# Patient Record
Sex: FEMALE | Race: WHITE | NOT HISPANIC OR LATINO | Employment: FULL TIME | URBAN - METROPOLITAN AREA
[De-identification: names, ages, dates, MRNs, and addresses within clinical notes are randomized per-mention and may not be internally consistent; named-entity substitution may affect disease eponyms.]

---

## 2020-07-16 ENCOUNTER — OFFICE VISIT CONVERTED (OUTPATIENT)
Dept: SURGERY | Facility: CLINIC | Age: 50
End: 2020-07-16
Attending: SURGERY

## 2020-08-05 ENCOUNTER — HOSPITAL ENCOUNTER (OUTPATIENT)
Dept: GENERAL RADIOLOGY | Facility: HOSPITAL | Age: 50
Discharge: HOME | End: 2020-08-05
Attending: NURSE PRACTITIONER

## 2020-08-20 ENCOUNTER — CONVERSION ENCOUNTER (OUTPATIENT)
Dept: CARDIOLOGY | Facility: CLINIC | Age: 50
End: 2020-08-20
Attending: INTERNAL MEDICINE

## 2020-08-20 ENCOUNTER — CONVERSION ENCOUNTER (OUTPATIENT)
Dept: OTHER | Facility: HOSPITAL | Age: 50
End: 2020-08-20

## 2021-01-08 ENCOUNTER — HOSPITAL ENCOUNTER (OUTPATIENT)
Dept: GASTROENTEROLOGY | Facility: HOSPITAL | Age: 51
Setting detail: HOSPITAL OUTPATIENT SURGERY
Discharge: HOME | End: 2021-01-08
Attending: SURGERY

## 2021-01-08 LAB — HCG UR QL: NEGATIVE

## 2021-05-10 NOTE — H&P
History and Physical      Patient Name: Librado Le   Patient ID: [id number]   Sex: Female   YOB: 1970    Primary Care Provider: Easton WILKINSON   Referring Provider: Easton WILKINSON    Visit Date: July 16, 2020    Provider: Roldan Metzger MD   [facility]   Location Address: [address] [place] [address]  [id number]   Location Phone: [phone]          Chief Complaint  · Outpatient History & Physical / Surgical Orders  · Colon Consult  · FAMILY HISTORY OF COLON CANCER  · LAST 10 YEARS AGO      History Of Present Illness  Librado Le is a 50 year old /White female who presents to the office today as a consult from Easton WILKINSON. She presents today for evaluation for a colonoscopy. She reports she had a colonoscopy about 15-20 years ago. Her father was diagnosed with colon cancer in his early 50's and they started doing colonoscopies early. She had a colonoscopy about 15 years ago or more and it was normal. She hasn't had a colonoscopy since. Currently, she is not reporting any symptoms. No nausea or vomiting, No fevers or chills. She is eating and drinking normally and having regular bowel movements. No narrowed or pencil thin stools. No blood per rectum. As stated, she does have the known family history of colon cancer.       Past Medical History  Allergic rhinitis, chronic; Allergies; Bladder Disorder         Past Surgical History  LASIK         Medication List  Multi Vitamin 9 mg iron/15 mL oral liquid         Allergy List  NO KNOWN DRUG ALLERGIES; NO KNOWN DRUG ALLERGIES       Allergies Reconciled  Family Medical History  Breast Neoplasm; Colon Cancer; Family history of colon cancer; Family history of breast cancer         Social History  Alcohol use; Tobacco (Never)         Review of Systems  · Gastrointestinal  o Denies  o : nausea, vomiting, diarrhea, constipation      Vitals  Date Time BP Position Site L\R Cuff Size HR RR TEMP (F) WT  HT   "BMI kg/m2 BSA m2 O2 Sat        07/16/2020 09:17 AM         180lbs 0oz 5'  6\" 29.05 1.95           Physical Examination  · Constitutional  o Appearance  o : well developed, well-nourished, alert and in no acute distress  · Head and Face  o Head  o :   § Inspection  § : no deformities or lesions  · Eyes  o Conjunctivae  o : clear  o Sclerae  o : clear  · Neck  o Inspection/Palpation  o : normal appearance, no masses or tenderness, trachea midline  · Respiratory  o Respiratory Effort  o : breathing unlabored  o Inspection of Chest  o : normal appearance, no retractions  · Cardiovascular  o Heart  o : regular rate and rhythm  · Gastrointestinal  o Abdominal Examination  o : abdomen is soft  · Lymphatic  o Neck  o : no lymphadenopathy present  o Axilla  o : no lymphadenopathy present  o Groin  o : no lymphadenopathy present  · Skin and Subcutaneous Tissue  o General Inspection  o : no rashes present, no lesions present, no areas of discoloration  · Neurologic  o Cranial Nerves  o : grossly intact  o Sensation  o : grossly intact  o Gait and Station  o :   § Gait Screening  § : normal gait, able to stand without diffculty  o Cerebellar Function  o : no obvious abnormalities  · Psychiatric  o Judgement and Insight  o : judgment and insight intact  o Mood and Affect  o : mood normal, affect appropriate          Assessment  · Pre-Surgical Orders     V72.84  · Screening for colon cancer     V76.51/Z12.11  · Pre-op testing     V72.84/Z01.818  · Family history of colon cancer     V16.0/Z80.0       Patient in need of a screening colonoscopy for a family history of colon cancer.     Problems Reconciled  Plan  · Orders  o Colonoscopy (85780) - V76.51/Z12.11, V16.0/Z80.0 - 09/11/2020  o [facility] Pre-Op Covid-19 Screening (85505) - V72.84/Z01.818 - 09/08/2020   [address] -- 09/08 @  **SELF ISOLATE AFTER TEST IS DONE UNTIL DAY OF PROCEDURE**  · Medications  o Suprep Bowel Prep Kit 17.5-3.13-1.6 gram oral recon soln "   SIG: take as directed for 1 day   DISP: (1) 177 ml bottle with 0 refills  Prescribed on 07/16/2020     o Medications have been Reconciled  o Transition of Care or Provider Policy  · Instructions  o PLAN:   o Handouts Provided-Pre-Procedure Instructions including date and time and location of procedure.  o Surgical Facility: [facility]  o ****Patient Status****  o Outpatient  o ********************  o RISK AND BENEFITS:  o Consent for surgery: Given these options, the patient has verbally expressed an understanding of the risks of surgery and finds these risks acceptable. We will proceed with surgery as soon as possible.  o Consult Anesthesia for any post-operative block, or any pain management procedure deemed necessary by the anestesiologist for adequate post-operative pain control.   o O.R. PREP: Per protocol  o IV: Per Anesthesia  o PLEASE SIGN PERMIT FOR: COLONOSCOPY WITH POSSILE BIOPSIES  o *___The above History and Physical Examination has been completed within 30 days of admission.  o Electronically Identified Patient Education Materials Provided Electronically  · Referrals  o ID: [id number] Date: 07/14/2020 Type: Inbound  Specialty: General Surgery     We will plan for a screening colonoscopy in the near future. Risks, benefits, and alternatives were discussed with the patient extensively. All questions were answered. The patient voiced understanding and agrees to proceed with the above plan.             Electronically Signed by: Funmilayo Hawthorne-, -Author on July 16, 2020 01:37:13 PM  Electronically Co-signed by: Roldan Metzger MD -Reviewer on July 16, 2020 10:36:19 PM

## 2021-05-10 NOTE — H&P
History and Physical      Patient Name: Librado Le   Patient ID: [id number]   Sex: Female   YOB: 1970    Primary Care Provider: Easton WILKINSON   Referring Provider: Easton WILKINSON    Visit Date: August 20, 2020    Provider: Roldan Perrin MD   Location: [facility]   Location Address: [address] [place] [address], [facility]   ARLENE [place]  [id number]   Location Phone: [phone]          Chief Complaint  · Palpitations      History Of Present Illness  Consult requested by: Easton WILKINSON   Librado Le is a 50-year-old female with symptoms of heart palpitations that she states started in December when she was in [place]. This episode occurred after she had had alcoholic beverages that night. She woke up about 2 AM with tachycardia. Unsure of how long it persisted. It eventually did get better. No related associated symptoms otherwise and she went back to sleep. She woke up suddenly again but does not remember why. She states she may have had one other episode of tachycardia in January. She denies chest pain or shortness of breath, and she has had no syncope.   PAST MEDICAL HISTORY: Significant for sleep apnea.   FAMILY MEDICAL HISTORY: Significant for her mom who had MI in her 40s but no stenting or bypass surgery.   PSYCHOSOCIAL HISTORY: The patient is . No history of mood change or depression. She does not smoke. She uses a moderate amount of alcohol and has caffeine daily.   CURRENT MEDICATIONS: Multivitamin daily.   ALLERGIES: No known drug allergies.       Review of Systems  · Constitutional  o Admits  o : good general health lately  o Denies  o : fatigue, recent weight changes   · Eyes  o Denies  o : double vision  · HENT  o Admits  o : hearing loss or ringing  o Denies  o : chronic sinus problem, swollen glands in neck  · Cardiovascular  o Admits  o : palpitations (fast, fluttering, or skipping beats), swelling (feet, ankles, hands)  o Denies  o : chest  "pain, shortness of breath while walking or lying flat  · Respiratory  o Denies  o : asthma or wheezing, COPD  · Gastrointestinal  o Denies  o : ulcers, nausea or vomiting  · Neurologic  o Denies  o : lightheaded or dizzy, stroke, headaches  · Musculoskeletal  o Denies  o : joint pain, back pain  · Endocrine  o Denies  o : thyroid disease, diabetes, heat or cold intolerance, excessive thirst or urination  · Heme-Lymph  o Denies  o : bleeding or bruising tendency, anemia      Vitals  Date Time BP Position Site L\R Cuff Size HR RR TEMP (F) WT  HT  BMI kg/m2 BSA m2 O2 Sat HC       08/20/2020 10:14 /74 Sitting    62 - R   182lbs 0oz 5'  6\" 29.38 1.96     08/20/2020 10:14 /74 Sitting    60 - R                 Physical Examination  · Constitutional  o Appearance  o : Awake, alert, in no acute distress.   · Head and Face  o HEENT  o : PERRLA.  · Eyes  o Conjunctivae  o : Normal.  · Ears, Nose, Mouth and Throat  o Oral Cavity  o :   § Oral Mucosa  § : Normal.  · Neck  o Inspection/Palpation  o : No JVD.  · Respiratory  o Respiratory  o : Chest is symmetrical. Lung fields are clear. No rhonchi or wheezes heard.  · Cardiovascular  o Heart  o :   § Auscultation of Heart  § : S1, S2 are normal. Regular rate and rhythm without murmurs, gallops, or rubs.  o Peripheral Vascular System  o :   § Extremities  § : Nails normal. No clubbing or cyanosis. Femoral pulses adequate. Pedal pulses adequate. No peripheral edema.  · Gastrointestinal  o Abdominal Examination  o : Abdomen soft. No masses. No guarding or rigidity. No hepatosplenomegaly. Bowel sounds normal.  · Musculoskeletal  o General  o :   § General Musculoskeletal  § : Muscle tone and strength were normal.  · Skin and Subcutaneous Tissue  o General Inspection  o : Unremarkable.  · EKG  o Results  o : Normal sinus rhythm.     Echocardiogram today showed normal ejection fraction of 55% with trace mitral and tricuspid regurgitation.           Assessment     ASSESSMENT " AND PLAN:  1.  Tachycardia.  Patient's issues may have been some kind of paroxysmal SVT possibly induced by her alcohol        use.  She does have a structurally normal echocardiogram and EKG making any related dysrhythmias very        unlikely, and she has not really had any recurrent episodes in the last six months.  Recommended        minimizing alcohol use to no more than two beverages per day.  If patient has more symptomatic recurrent        episodes, she is to call back and we will get a 24-hour Holter monitor.   2.  Borderline hypertension.  Recommended keeping a home blood pressure log, low sodium diet, exercising        aerobically, and weight loss.    MD TIEN Celaya/naa    This note was transcribed by Nelida Armendariz.  naa/tien  The above service was transcribed by Nelida Armendariz, and I attest to the accuracy of the note.  TIEN             Electronically Signed by: Joleen Armendariz-, Other -Author on August 21, 2020 10:05:59 AM  Electronically Co-signed by: Roldan Perrin MD -Reviewer on August 24, 2020 08:41:55 AM

## 2021-05-10 NOTE — PROCEDURES
"   Procedure Note      Patient Name: Librado Le   Patient ID: [id number]   Sex: Female   YOB: 1970    Primary Care Provider: Easton WILKINSON   Referring Provider: Easton WILKINSON    Visit Date: August 20, 2020    Provider: Roldan Perrin MD   Location: [facility]   Location Address: [address] [place] [address], [address]   AR[place]E [place]  [id number]   Location Phone: [phone]          FINAL REPORT   TRANSTHORACIC ECHOCARDIOGRAM REPORT    Diagnosis: Palpitations   Height: 5'6\" Weight: 182 B/P: 152/74 BSA: 2.0   Tech: JTW   MEASUREMENTS:  RVID (Diastole) : RVID. (NORMAL: 0.7 to 2.4 cm max)   LVID (Systole): 3.2 cm (Diastole): 4.5 cm. (NORMAL: 3.7 - 5.4 cm)   Posterior Wall Thickness (Diastole): 1.0 cm. (NORMAL: 0.8 - 1.1 cm)   Septal Thickness (Diastole): 1.0 cm. (NORMAL: 0.7 - 1.2 cm)   LAID (Systole): 3.3 cm. (NORMAL: 1.9 - 3.8 cm)   Aortic Root Diameter (Diastole): 2.8 cm. (NORMAL: 2.0 - 3.7 cm)   MITRL.VLV.CLAIRE.D.D.R: 80 mm/sec-150 mm/sec   DOPPLER: Continuous-wave, pulse-wave, and color-flow examination of the mitral, aortic, and tricuspid valves was performed. No significant stenosis was identified. Trace mitral and tricuspid regurgitation. Doppler flow velocities were normal. E/A ratio is 1.4. DT= 190 msec. IVRT is 109 msec. E/e' is 11.   COMMENTS:  The patient underwent 2-D, M-Mode, and Doppler examination, including pulse-wave, continuous-wave, and color-flow Doppler analysis; the study is technically adequate. The following was observed:   FINDINGS:  AORTIC VALVE: Appeared to be normal. Trileaflet with central closure point. No evidence of any obstruction. No regurgitation.   MITRAL VALVE: Appeared to be normal. No evidence of any obstruction. No regurgitation.   TRICUSPID VALVE: Appeared to be normal.   PULMONIC VALVE: Not well seen.   LEFT ATRIUM: Appeared to be normal. No intracavity masses or clots seen. LA volume index is 18 mL/m2.   AORTIC ROOT: Appeared to be " normal in size.   LEFT VENTRICLE: Appeared to have an overall normal left ventricular systolic function with a normal EF of 55%. There is no evidence of any wall motion abnormalities. No cavitary dilatation. Normal wall thickness.   RIGHT ATRIUM: Appeared to be normal; no obvious evidence of intracavity masses or clots.   RIGHT VENTRICLE: Normal size and function.   PERICARDIUM: Unremarkable. No evidence of effusion.   INFERIOR VENA CAVA: Diameter is 1.8 cm.   Fax: 08/21/2020      CONCLUSION:  1.  Normal ejection fraction of 55%.   2.  Trace mitral regurgitation.   3.  Trace tricuspid regurgitation.      MD TIEN Celaya/naa    This note was transcribed by Nelida Armendariz.  naa/tien  The above service was transcribed by Nelida Armendariz, and I attest to the accuracy of the note.                   Electronically Signed by: Joleen Armendariz-, Other -Author on August 21, 2020 10:08:25 AM  Electronically Co-signed by: Roldan Perrin MD -Reviewer on August 24, 2020 08:48:09 AM

## 2021-05-14 VITALS
HEART RATE: 62 BPM | WEIGHT: 182 LBS | DIASTOLIC BLOOD PRESSURE: 74 MMHG | BODY MASS INDEX: 29.25 KG/M2 | HEIGHT: 66 IN | SYSTOLIC BLOOD PRESSURE: 152 MMHG

## 2021-05-15 VITALS — BODY MASS INDEX: 28.93 KG/M2 | HEIGHT: 66 IN | WEIGHT: 180 LBS

## 2021-09-22 ENCOUNTER — TRANSCRIBE ORDERS (OUTPATIENT)
Dept: ADMINISTRATIVE | Facility: HOSPITAL | Age: 51
End: 2021-09-22

## 2021-09-22 DIAGNOSIS — Z12.31 SCREENING MAMMOGRAM FOR HIGH-RISK PATIENT: Primary | ICD-10-CM

## 2021-12-13 ENCOUNTER — HOSPITAL ENCOUNTER (OUTPATIENT)
Dept: MAMMOGRAPHY | Facility: HOSPITAL | Age: 51
Discharge: HOME | End: 2021-12-13
Admitting: NURSE PRACTITIONER

## 2021-12-13 DIAGNOSIS — Z12.31 SCREENING MAMMOGRAM FOR HIGH-RISK PATIENT: ICD-10-CM

## 2021-12-13 PROCEDURE — 77067 SCR MAMMO BI INCL CAD: CPT

## 2021-12-13 PROCEDURE — 77063 BREAST TOMOSYNTHESIS BI: CPT

## 2022-09-22 ENCOUNTER — TRANSCRIBE ORDERS (OUTPATIENT)
Dept: ADMINISTRATIVE | Facility: HOSPITAL | Age: 52
End: 2022-09-22

## 2022-09-22 DIAGNOSIS — Z12.31 SCREENING MAMMOGRAM, ENCOUNTER FOR: Primary | ICD-10-CM

## 2022-10-31 ENCOUNTER — TELEPHONE (OUTPATIENT)
Dept: OBSTETRICS AND GYNECOLOGY | Facility: CLINIC | Age: 52
End: 2022-10-31

## 2022-12-16 ENCOUNTER — HOSPITAL ENCOUNTER (OUTPATIENT)
Dept: MAMMOGRAPHY | Facility: HOSPITAL | Age: 52
Discharge: HOME | End: 2022-12-16
Admitting: NURSE PRACTITIONER

## 2022-12-16 DIAGNOSIS — Z12.31 SCREENING MAMMOGRAM, ENCOUNTER FOR: ICD-10-CM

## 2022-12-16 PROCEDURE — 77067 SCR MAMMO BI INCL CAD: CPT

## 2022-12-16 PROCEDURE — 77063 BREAST TOMOSYNTHESIS BI: CPT

## 2022-12-29 ENCOUNTER — OFFICE VISIT (OUTPATIENT)
Dept: OBSTETRICS AND GYNECOLOGY | Facility: CLINIC | Age: 52
End: 2022-12-29

## 2022-12-29 VITALS — SYSTOLIC BLOOD PRESSURE: 145 MMHG | WEIGHT: 194.2 LBS | BODY MASS INDEX: 31.34 KG/M2 | DIASTOLIC BLOOD PRESSURE: 86 MMHG

## 2022-12-29 DIAGNOSIS — Z76.89 ENCOUNTER TO ESTABLISH CARE: Primary | ICD-10-CM

## 2022-12-29 DIAGNOSIS — R87.610 ATYPICAL SQUAMOUS CELL CHANGES OF UNDETERMINED SIGNIFICANCE (ASCUS) ON CERVICAL CYTOLOGY WITH NEGATIVE HIGH RISK HUMAN PAPILLOMA VIRUS (HPV) TEST RESULT: ICD-10-CM

## 2022-12-29 PROBLEM — Z80.3 FAMILY HX-BREAST MALIGNANCY: Status: ACTIVE | Noted: 2022-12-29

## 2022-12-29 PROCEDURE — 99202 OFFICE O/P NEW SF 15 MIN: CPT | Performed by: OBSTETRICS & GYNECOLOGY

## 2022-12-29 RX ORDER — MULTIPLE VITAMINS W/ MINERALS TAB 9MG-400MCG
1 TAB ORAL DAILY
COMMUNITY

## 2022-12-29 RX ORDER — RIBOFLAVIN (VITAMIN B2) 100 MG
1 TABLET ORAL DAILY
COMMUNITY

## 2022-12-29 NOTE — PROGRESS NOTES
GYN Visit    HPI:   52 y.o. LMP: Patient's last menstrual period was 2022 (approximate).  She is here to establish care. She was original sent for abnormal pap smear but it was ASCUS and HPV negative, she would qualify for repeat pap smear in 3 years. She is not menopausal yet but her periods are regular, less than 7 days and not heavy. She denies any vasomotor symptoms or vaginal dryness. She is sexually active with one male partner and denies any h/o stds or pain with intercourse. She does have family H/O breast cancer in her maternal grandmother and maternal aunt but both diagnosed older than 50. No family hx of uterine or ovarian cancer. She denies any incontinence issues. She denies any vaginal odor, discharge or irritation, dysuria or hematuria, F/C, D/C, N/V, CP or SOB.     Social History     Substance and Sexual Activity   Sexual Activity Not Currently   • Partners: Male       History: PMHx, Meds, Allergies, PSHx, Social Hx, and POBHx all reviewed and updated.    PHYSICAL EXAM:  /86 (BP Location: Left arm, Patient Position: Sitting, Cuff Size: Adult)   Wt 88.1 kg (194 lb 3.2 oz)   LMP 2022 (Approximate)   Breastfeeding No   BMI 31.34 kg/m²   General- NAD, alert and oriented, appropriate  Psych- Normal mood, good memory  Neck- No masses, no thyroid enlargement  Cardiovascular- Regular rhythm, no murnurs  Respiratory- CTA to bases, no wheezes  Abdomen- Soft, non distended, non tender, no masses        ASSESSMENT AND PLAN:  Diagnoses and all orders for this visit:    1. Encounter to establish care (Primary)  -     Cancel: IgP, Aptima HPV    2. Atypical squamous cell changes of undetermined significance (ASCUS) on cervical cytology with negative high risk human papilloma virus (HPV) test result    Other orders  -     SCANNED - PAP SMEAR      Follow Up:  Return in about 6 months (around 2023) for Annual exam.    I spent 20 minutes caring for Librado on this date of service. This  time includes time spent by me in the following activities:preparing for the visit, reviewing tests, obtaining and/or reviewing a separately obtained history, performing a medically appropriate examination and/or evaluation  and counseling and educating the patient/family/caregiver    Devi Loredo DO  12/29/2022    [facility]  [address] [place] [address]  Dept: [phone]  Dept Fax: [phone]  Loc: [phone]  Loc Fax: [phone]

## 2023-07-26 ENCOUNTER — TRANSCRIBE ORDERS (OUTPATIENT)
Dept: ADMINISTRATIVE | Facility: HOSPITAL | Age: 53
End: 2023-07-26
Payer: OTHER GOVERNMENT

## 2023-07-26 DIAGNOSIS — Z01.419 WELL WOMAN EXAM WITH ROUTINE GYNECOLOGICAL EXAM: Primary | ICD-10-CM

## 2024-01-09 ENCOUNTER — OFFICE VISIT (OUTPATIENT)
Dept: PULMONOLOGY | Facility: CLINIC | Age: 54
End: 2024-01-09
Payer: COMMERCIAL

## 2024-01-09 ENCOUNTER — HOSPITAL ENCOUNTER (OUTPATIENT)
Dept: GENERAL RADIOLOGY | Facility: HOSPITAL | Age: 54
Discharge: HOME | End: 2024-01-09
Admitting: INTERNAL MEDICINE
Payer: COMMERCIAL

## 2024-01-09 VITALS
BODY MASS INDEX: 31.98 KG/M2 | RESPIRATION RATE: 18 BRPM | DIASTOLIC BLOOD PRESSURE: 65 MMHG | TEMPERATURE: 100 F | WEIGHT: 199 LBS | HEIGHT: 66 IN | SYSTOLIC BLOOD PRESSURE: 128 MMHG | OXYGEN SATURATION: 99 % | HEART RATE: 69 BPM

## 2024-01-09 DIAGNOSIS — R09.82 POSTNASAL DRIP: ICD-10-CM

## 2024-01-09 DIAGNOSIS — R06.02 SHORTNESS OF BREATH: ICD-10-CM

## 2024-01-09 DIAGNOSIS — G47.33 OBSTRUCTIVE SLEEP APNEA: ICD-10-CM

## 2024-01-09 DIAGNOSIS — G47.33 OBSTRUCTIVE SLEEP APNEA: Primary | ICD-10-CM

## 2024-01-09 PROCEDURE — 71046 X-RAY EXAM CHEST 2 VIEWS: CPT

## 2024-01-09 PROCEDURE — 99203 OFFICE O/P NEW LOW 30 MIN: CPT | Performed by: INTERNAL MEDICINE

## 2024-01-09 RX ORDER — FLUTICASONE PROPIONATE 50 MCG
SPRAY, SUSPENSION (ML) NASAL
COMMUNITY
Start: 2023-08-31

## 2024-01-09 RX ORDER — FLUTICASONE PROPIONATE 50 MCG
2 SPRAY, SUSPENSION (ML) NASAL DAILY
Qty: 1 G | Refills: 5 | Status: SHIPPED | OUTPATIENT
Start: 2024-01-09

## 2024-01-09 RX ORDER — ERGOCALCIFEROL 1.25 MG/1
50000 CAPSULE ORAL
COMMUNITY
Start: 2023-11-13

## 2024-01-09 RX ORDER — CHLORAL HYDRATE 500 MG
CAPSULE ORAL
COMMUNITY

## 2024-01-09 RX ORDER — FEXOFENADINE HCL 180 MG/1
180 TABLET ORAL
COMMUNITY
Start: 2023-08-31

## 2024-01-09 RX ORDER — DESONIDE 0.5 MG/G
OINTMENT TOPICAL
COMMUNITY
Start: 2023-11-30

## 2024-01-09 NOTE — PROGRESS NOTES
Pulmonary Consultation    Aida Patton, APRN,    Thank you for asking me to see Librado Le for   Chief Complaint   Patient presents with    Establish Care    Shortness of Breath   .      History of Present Illness  Librado Le is a 53 y.o. female with a PMH significant for obstructive sleep apnea and nasal allergies presents for shortness of breath and wheezing off-and-on for the.  Of about more than a year she is using her CPAP and is compliant she has gained some weight she denies any significant cough or expectoration and there is no history of asthma patient does not smoke she denies any chest pain fever or weight loss or leg pain or swelling      Tobacco use history:  Never smoker      Review of Systems: History obtained from chart review and the patient.  Review of Systems   Respiratory:  Positive for shortness of breath and wheezing.    All other systems reviewed and are negative.    As described in the HPI. Otherwise, remainder of ROS (14 systems) were negative.    Patient Active Problem List   Diagnosis    Family hx-breast malignancy         Current Outpatient Medications:     desonide (DESOWEN) 0.05 % ointment, , Disp: , Rfl:     fexofenadine (ALLEGRA) 180 MG tablet, 1 tablet., Disp: , Rfl:     fluticasone (FLONASE) 50 MCG/ACT nasal spray, USE 2 SPRAYS IN EACH NOSTRIL DAILY CONGESTION - SHAKE WELL BEFORE USING. PLEASE OVERNIGHT, Disp: , Rfl:     multivitamin with minerals tablet tablet, Take 1 tablet by mouth Daily., Disp: , Rfl:     Omega-3 Fatty Acids (fish oil) 1000 MG capsule capsule, Take  by mouth., Disp: , Rfl:     psyllium (METAMUCIL) 58.6 % packet, Take 1 packet by mouth Daily., Disp: , Rfl:     vitamin D (ERGOCALCIFEROL) 1.25 MG (12425 UT) capsule capsule, Take 1 capsule by mouth Every 7 (Seven) Days., Disp: , Rfl:     fluticasone (FLONASE) 50 MCG/ACT nasal spray, 2 sprays into the nostril(s) as directed by provider Daily., Disp: 1 g, Rfl: 5    Allergies   Allergen Reactions  "   Eggs Or Egg-Derived Products Other (See Comments)       Past Medical History:   Diagnosis Date    Abnormal Pap smear of cervix     Chlamydia      Past Surgical History:   Procedure Laterality Date    LASIK       Social History     Socioeconomic History    Marital status:    Tobacco Use    Smoking status: Never    Smokeless tobacco: Never   Vaping Use    Vaping Use: Never used   Substance and Sexual Activity    Alcohol use: Yes     Comment: occasionally    Drug use: Never    Sexual activity: Not Currently     Partners: Male     Family History   Problem Relation Age of Onset    Colon cancer Father         50s    Breast cancer Maternal Grandmother         60s    Breast cancer Maternal Aunt         Pt does not know age of onset    Ovarian cancer Neg Hx     Uterine cancer Neg Hx     Melanoma Neg Hx     Deep vein thrombosis Neg Hx     Pulmonary embolism Neg Hx        No radiology results for the last 90 days.        Objective     Blood pressure 128/65, pulse 69, temperature 100 °F (37.8 °C), temperature source Temporal, resp. rate 18, height 167.6 cm (66\"), weight 90.3 kg (199 lb), SpO2 99%, not currently breastfeeding.  Physical Exam  Vitals and nursing note reviewed.   Constitutional:       Appearance: Normal appearance.   HENT:      Head: Normocephalic and atraumatic.      Nose: Congestion present.      Mouth/Throat:      Mouth: Mucous membranes are moist.      Pharynx: Oropharynx is clear.   Eyes:      Extraocular Movements: Extraocular movements intact.      Conjunctiva/sclera: Conjunctivae normal.      Pupils: Pupils are equal, round, and reactive to light.   Cardiovascular:      Rate and Rhythm: Normal rate and regular rhythm.      Pulses: Normal pulses.      Heart sounds: Normal heart sounds.   Pulmonary:      Effort: Pulmonary effort is normal.      Breath sounds: Normal breath sounds.   Abdominal:      General: Abdomen is flat. Bowel sounds are normal.      Palpations: Abdomen is soft. "   Musculoskeletal:         General: Normal range of motion.      Cervical back: Normal range of motion and neck supple.   Skin:     General: Skin is warm.      Capillary Refill: Capillary refill takes 2 to 3 seconds.   Neurological:      General: No focal deficit present.      Mental Status: She is alert and oriented to person, place, and time.   Psychiatric:         Mood and Affect: Mood normal.         Behavior: Behavior normal.       Immunization History   Administered Date(s) Administered    Adenovirus, Unspecified Formulation 02/17/1992    Anthrax 05/06/1999, 07/22/1999, 08/09/1999, 01/10/2000, 07/21/2000, 09/28/2004, 06/08/2012    FluMist 2-49yrs 10/23/2013, 09/23/2014, 09/28/2015    Fluzone (or Fluarix & Flulaval for VFC) >6mos 02/22/2022    H1N1 Nasal 12/16/2009, 12/17/2009    Hepatitis A 05/05/2003, 09/28/2004    Hepatitis B Adult/Adolescent IM 05/04/1992, 01/24/2003, 02/25/2003, 09/01/2009    IPV 02/17/1992    Influenza LAIV (Nasal) 11/01/2005, 11/01/2007, 11/02/2007, 10/16/2008, 09/28/2009, 09/24/2010, 09/27/2011, 10/23/2013    Influenza Quad Vaccine (Inpatient) 09/24/2012, 09/27/2016    Influenza Seasonal Injectable 12/18/2017    Influenza Whole 09/24/2010    MMR 02/17/1992    Meningococcal Polysaccharide 02/17/1992    PPD Test 09/01/2009    Rabies IM 2 05/04/1992, 05/11/1992, 06/01/1992    Smallpox 06/08/2012    TD Preservative Free (Tenivac) 01/03/2023    Td (TDVAX) 01/24/2003    Tdap 06/01/2012, 06/08/2012    Typhoid Inactivated 10/26/1993, 09/28/2004, 06/08/2012    Yellow Fever 10/26/1993    influenza Split 11/25/2002, 10/27/2003, 01/26/2005, 11/08/2006, 11/02/2007            Assessment & Plan     Diagnoses and all orders for this visit:    1. Obstructive sleep apnea (Primary)  -     Complete PFT - Pre & Post Bronchodilator; Future  -     XR Chest 2 View; Future    2. Shortness of breath  -     Complete PFT - Pre & Post Bronchodilator; Future  -     XR Chest 2 View; Future    3. Postnasal drip  -      Complete PFT - Pre & Post Bronchodilator; Future  -     XR Chest 2 View; Future    Other orders  -     fluticasone (FLONASE) 50 MCG/ACT nasal spray; 2 sprays into the nostril(s) as directed by provider Daily.  Dispense: 1 g; Refill: 5         Result Review :       Data reviewed : Radiologic studies        Discussion/ Recommendations:   Will order PFTs and chest x-ray for further evaluation  Advised to continue with the CPAP and compliance  Advised to reduce weight her BMI is 32.12  Regular exercise  Will start her on Flonase nasal spray for postnasal drip  Discussed vaccination and recommended    BMI is >= 30 and <35. (Class 1 Obesity). The following options were offered after discussion;: exercise counseling/recommendations           Return in about 1 month (around 2/9/2024).      Thank you for allowing me to participate in the care of Librado Le. Please do not hesitate to contact me with any questions.         This document has been electronically signed by Marcelo Michael MD on January 9, 2024 08:41 EST

## 2024-01-30 ENCOUNTER — HOSPITAL ENCOUNTER (OUTPATIENT)
Dept: RESPIRATORY THERAPY | Facility: HOSPITAL | Age: 54
Discharge: HOME | End: 2024-01-30
Admitting: INTERNAL MEDICINE
Payer: OTHER GOVERNMENT

## 2024-01-30 DIAGNOSIS — G47.33 OBSTRUCTIVE SLEEP APNEA: ICD-10-CM

## 2024-01-30 DIAGNOSIS — R09.82 POSTNASAL DRIP: ICD-10-CM

## 2024-01-30 DIAGNOSIS — R06.02 SHORTNESS OF BREATH: ICD-10-CM

## 2024-01-30 PROCEDURE — 94729 DIFFUSING CAPACITY: CPT

## 2024-01-30 PROCEDURE — 94060 EVALUATION OF WHEEZING: CPT

## 2024-01-30 PROCEDURE — 94726 PLETHYSMOGRAPHY LUNG VOLUMES: CPT

## 2024-01-30 RX ORDER — ALBUTEROL SULFATE 2.5 MG/3ML
2.5 SOLUTION RESPIRATORY (INHALATION) ONCE
Status: COMPLETED | OUTPATIENT
Start: 2024-01-30 | End: 2024-01-30

## 2024-01-30 RX ADMIN — ALBUTEROL SULFATE 2.5 MG: 2.5 SOLUTION RESPIRATORY (INHALATION) at 12:55

## 2024-02-08 ENCOUNTER — OFFICE VISIT (OUTPATIENT)
Dept: PULMONOLOGY | Facility: CLINIC | Age: 54
End: 2024-02-08
Payer: OTHER GOVERNMENT

## 2024-02-08 VITALS
BODY MASS INDEX: 32.17 KG/M2 | OXYGEN SATURATION: 99 % | DIASTOLIC BLOOD PRESSURE: 55 MMHG | RESPIRATION RATE: 16 BRPM | WEIGHT: 200.2 LBS | SYSTOLIC BLOOD PRESSURE: 144 MMHG | HEIGHT: 66 IN | TEMPERATURE: 98.4 F | HEART RATE: 54 BPM

## 2024-02-08 DIAGNOSIS — G47.33 OBSTRUCTIVE SLEEP APNEA: Primary | ICD-10-CM

## 2024-02-08 DIAGNOSIS — R06.2 WHEEZING: ICD-10-CM

## 2024-02-08 DIAGNOSIS — R06.02 SHORTNESS OF BREATH: ICD-10-CM

## 2024-02-08 DIAGNOSIS — R09.82 POSTNASAL DRIP: ICD-10-CM

## 2024-02-08 PROCEDURE — 99214 OFFICE O/P EST MOD 30 MIN: CPT | Performed by: INTERNAL MEDICINE

## 2024-02-08 RX ORDER — ALBUTEROL SULFATE 90 UG/1
2 AEROSOL, METERED RESPIRATORY (INHALATION) EVERY 4 HOURS PRN
Qty: 1 G | Refills: 3 | Status: SHIPPED | OUTPATIENT
Start: 2024-02-08

## 2024-02-08 NOTE — PROGRESS NOTES
Pulmonary Office Follow-up    Subjective     Librado Le is seen today at the office for   Chief Complaint   Patient presents with    Sleep Apnea    Follow-up    Results    Shortness of Breath    Cough    Wheezing         HPI  Librado Le is a 53 y.o. female with a PMH significant for obstructive sleep apnea nasal allergies complains of wheezing off-and-on patient denies any shortness of breath she has been staying active and going to the gym patient does not smoke and denies any cough or expectoration she has been compliant with her CPAP      Tobacco use history:  Never smoker      Patient Active Problem List   Diagnosis    Family hx-breast malignancy       Review of Systems  Review of Systems   Respiratory:  Positive for wheezing.    All other systems reviewed and are negative.    As described in the HPI. Otherwise, remainder of ROS (14 systems) were negative.    Medications, Allergies, Social, and Family Histories reviewed as per EMR.    Result Review :       Data reviewed : PFT      Objective     Vitals:    02/08/24 0829   BP: 144/55   Pulse: 54   Resp: 16   Temp: 98.4 °F (36.9 °C)   SpO2: 99%         02/08/24 0829   Weight: 90.8 kg (200 lb 3.2 oz)       Physical Exam  Vitals and nursing note reviewed.   Constitutional:       Appearance: Normal appearance.   HENT:      Head: Normocephalic and atraumatic.      Nose: Nose normal.      Mouth/Throat:      Mouth: Mucous membranes are moist.      Pharynx: Oropharynx is clear.   Eyes:      Extraocular Movements: Extraocular movements intact.      Conjunctiva/sclera: Conjunctivae normal.      Pupils: Pupils are equal, round, and reactive to light.   Cardiovascular:      Rate and Rhythm: Normal rate and regular rhythm.      Pulses: Normal pulses.      Heart sounds: Normal heart sounds.   Pulmonary:      Effort: Pulmonary effort is normal.      Breath sounds: Normal breath sounds.   Abdominal:      General: Abdomen is flat. Bowel sounds are normal.       Palpations: Abdomen is soft.   Musculoskeletal:         General: Normal range of motion.      Cervical back: Normal range of motion and neck supple.   Skin:     General: Skin is warm.      Capillary Refill: Capillary refill takes 2 to 3 seconds.   Neurological:      General: No focal deficit present.      Mental Status: She is alert and oriented to person, place, and time.   Psychiatric:         Mood and Affect: Mood normal.         Behavior: Behavior normal.         XR Chest 2 View    Result Date: 1/9/2024   No active disease     Roldan Day MD       Electronically Signed and Approved By: Roldan Day MD on 1/09/2024 at 9:31               Assessment & Plan     Diagnoses and all orders for this visit:    1. Obstructive sleep apnea (Primary)    2. Shortness of breath    3. Postnasal drip    4. Wheezing    Other orders  -     albuterol sulfate HFA (Ventolin HFA) 108 (90 Base) MCG/ACT inhaler; Inhale 2 puffs Every 4 (Four) Hours As Needed for Wheezing or Shortness of Air.  Dispense: 1 g; Refill: 3         Discussion/ Recommendations:   PFT was reviewed which was within normal limits  Patient is advised to reduce weight her BMI is 32.31  Advised to continue compliance with her CPAP along with the nasal spray  Albuterol inhaler 2 puffs every 6 hours as needed for wheezing on account of mild intermittent asthma  Chest x-ray reviewed within normal limits  Vaccinations discussed and recommended             Return in about 3 months (around 5/8/2024).          This document has been electronically signed by Marcelo Michael MD on February 8, 2024 08:37 EST

## 2024-04-03 ENCOUNTER — HOSPITAL ENCOUNTER (OUTPATIENT)
Dept: MAMMOGRAPHY | Facility: HOSPITAL | Age: 54
Discharge: HOME | End: 2024-04-03
Admitting: OBSTETRICS & GYNECOLOGY
Payer: COMMERCIAL

## 2024-04-03 DIAGNOSIS — Z01.419 WELL WOMAN EXAM WITH ROUTINE GYNECOLOGICAL EXAM: ICD-10-CM

## 2024-04-03 PROCEDURE — 77063 BREAST TOMOSYNTHESIS BI: CPT

## 2024-04-03 PROCEDURE — 77067 SCR MAMMO BI INCL CAD: CPT

## 2024-05-07 ENCOUNTER — OFFICE VISIT (OUTPATIENT)
Dept: PULMONOLOGY | Facility: CLINIC | Age: 54
End: 2024-05-07
Payer: OTHER GOVERNMENT

## 2024-05-07 VITALS
WEIGHT: 192.6 LBS | DIASTOLIC BLOOD PRESSURE: 81 MMHG | SYSTOLIC BLOOD PRESSURE: 153 MMHG | HEART RATE: 59 BPM | RESPIRATION RATE: 14 BRPM | TEMPERATURE: 98.6 F | HEIGHT: 66 IN | OXYGEN SATURATION: 98 % | BODY MASS INDEX: 30.95 KG/M2

## 2024-05-07 DIAGNOSIS — R09.82 POSTNASAL DRIP: ICD-10-CM

## 2024-05-07 DIAGNOSIS — G47.33 OBSTRUCTIVE SLEEP APNEA: Primary | ICD-10-CM

## 2024-05-07 PROCEDURE — 99213 OFFICE O/P EST LOW 20 MIN: CPT | Performed by: INTERNAL MEDICINE

## 2024-07-22 NOTE — PROGRESS NOTES
"Well Woman Visit    CC: Scheduled annual well gyn visit  Chief Complaint   Patient presents with    Annual Exam         HPI:   54 y.o. who presents today for annual exam. Patient states periods are still monthly, less than 7 days and not heavy in nature. She denies any vasomotor symptoms.   She is sexually active with one male partner. She denies any H/O STDS.  She denies any pain with intercourse. She does have family H/O breast cancer in her maternal grandmother and maternal aunt but both diagnosed older than 50. No family hx of uterine or ovarian cancer. She denies any vaginal odor, vaginal discharge, dysuria/hematuria, F/C, D/C, N/V, CP or SOB. She denies any difficulties with urination or incontinence issues    History: PMHx, Meds, Allergies, PSHx, Social Hx, and POBHx all reviewed and updated.    ROS:  General ROS: negative for chills or fatigue  Ophthalmic ROS: negative for blurry vision or decreased vision  ENT ROS: negative for epistaxis, headaches or hearing change  Hematological and Lymphatic ROS: negative for bleeding problems or blood clots  Endocrine ROS: negative for breast changes or galactorrhea  Breast ROS: negative for galactorrhea or new or changing breast lumps  Respiratory ROS: negative for cough or hemoptysis  Cardiovascular ROS: negative for chest pain or dyspnea on exertion  Gastrointestinal ROS: negative for abdominal pain or appetite loss  Genito-Urinary ROS: negative for difficulty in urination or vaginal irritation   Musculoskeletal ROS: negative for gait disturbance or joint pain  Neurological ROS: negative for behavioral changes or bowel and bladder control changes  Dermatological ROS: negative for rash  Psych ROS: negative for depression      PHYSICAL EXAM:      /68   Ht 167.6 cm (66\")   Wt 85.3 kg (188 lb)   LMP 07/15/2024 (Within Days)   BMI 30.34 kg/m²  Not found.    General- NAD, alert and oriented, appropriate  Psych- Normal mood, good memory  Neck- No masses, no " thyroid enlargement  CV- Regular rhythm, no murnurs  Resp- CTA to bases, no wheezes  Abdomen- Soft, non distended, non tender, no masses    Breast Exam: Breast self awareness counseled  Breast left-  Bilaterally symmetrical, no masses, non tender, no nipple discharge  Breast right- Bilaterally symmetrical, no masses, non tender, no nipple discharge    Pelvic Exam:   External genitalia- Normal female, no lesions  Urethra/meatus- Normal, no masses, non tender  Bladder- Normal, no masses, non tender  Vagina- Normal, no atrophy, no lesions, no discharge.  Prolapse : none noted, not examined with split speculum to delineate  Cvx- Normal, no lesions, no discharge, No cervical motion tenderness  Uterus- Normal size, shape & consistency.  Non tender, mobile, & no prolapse  Adnexa- No mass, non tender      Lymphatic- No palpable neck, axillary, or groin nodes  Ext- No edema, no cyanosis    Skin- No lesions, no rashes, no acanthosis nigricans      ASSESSMENT and PLAN:    Diagnoses and all orders for this visit:    1. Well woman exam (Primary)  -     Mammo Screening Bilateral With CAD; Future        Preventative:  1. Annuals every year; Cytology collections per prevailing guidelines.   2. Mammograms begin every year at 39 yo if no abnormalities are found and no family history.  3. Bone density studies begin at 64 yo. If no fracture history or osteoporosis family history.  4. Colonoscopy begins at 46 yo. Repeat every ten years if negative and no family history.  5. Calcium of 5246-5087 mg/day in split dosing  6. Vitamin D 400-800 IU/day  7. All other preventative health recommendations will be managed by the patients Primary care physician.    She understands the importance of having any ordered tests to be performed in a timely fashion.  The risks of not performing them include, but are not limited to, advanced cancer stages, bone loss from osteoporosis and/or subsequent increase in morbidity and/or mortality.  She is  encouraged to review her results online and/or contact or office if she has questions.     Follow Up:  Return in about 1 year (around 7/23/2025) for Annual exam.    I spent 20 minutes on the separately reported service of annual. This time is not included in the time used to support the E/M service also reported today.        Devi Loredo, DO  07/23/2024    [facility]  [facility] [place] [address]  Dept: [phone]  Dept Fax: [phone]  Loc: [phone]  Loc Fax: [phone]

## 2024-07-23 ENCOUNTER — OFFICE VISIT (OUTPATIENT)
Dept: OBSTETRICS AND GYNECOLOGY | Facility: CLINIC | Age: 54
End: 2024-07-23
Payer: OTHER GOVERNMENT

## 2024-07-23 VITALS
DIASTOLIC BLOOD PRESSURE: 68 MMHG | SYSTOLIC BLOOD PRESSURE: 136 MMHG | WEIGHT: 188 LBS | BODY MASS INDEX: 30.22 KG/M2 | HEIGHT: 66 IN

## 2024-07-23 DIAGNOSIS — Z01.419 WELL WOMAN EXAM: Primary | ICD-10-CM

## 2024-07-23 PROCEDURE — 99396 PREV VISIT EST AGE 40-64: CPT | Performed by: OBSTETRICS & GYNECOLOGY
